# Patient Record
Sex: MALE | URBAN - METROPOLITAN AREA
[De-identification: names, ages, dates, MRNs, and addresses within clinical notes are randomized per-mention and may not be internally consistent; named-entity substitution may affect disease eponyms.]

---

## 2022-10-13 ENCOUNTER — APPOINTMENT (RX ONLY)
Dept: URBAN - METROPOLITAN AREA CLINIC 151 | Facility: CLINIC | Age: 32
Setting detail: DERMATOLOGY
End: 2022-10-13

## 2022-10-13 DIAGNOSIS — L21.8 OTHER SEBORRHEIC DERMATITIS: ICD-10-CM

## 2022-10-13 DIAGNOSIS — L29.89 OTHER PRURITUS: ICD-10-CM

## 2022-10-13 DIAGNOSIS — L29.8 OTHER PRURITUS: ICD-10-CM

## 2022-10-13 PROCEDURE — ? ORDER TESTS

## 2022-10-13 PROCEDURE — ? COUNSELING

## 2022-10-13 PROCEDURE — ? PRESCRIPTION

## 2022-10-13 PROCEDURE — 99204 OFFICE O/P NEW MOD 45 MIN: CPT

## 2022-10-13 PROCEDURE — ? DIAGNOSIS COMMENT

## 2022-10-13 RX ORDER — IVERMECTIN 3 MG/1
TABLET ORAL
Qty: 9 | Refills: 0 | Status: CANCELLED | COMMUNITY
Start: 2022-10-13

## 2022-10-13 RX ORDER — CICLOPIROX 10 MG/.96ML
SHAMPOO TOPICAL
Qty: 120 | Refills: 3 | Status: CANCELLED | COMMUNITY
Start: 2022-10-13

## 2022-10-13 RX ORDER — PERMETHRIN 50 MG/G
CREAM TOPICAL
Qty: 60 | Refills: 0 | Status: CANCELLED | COMMUNITY
Start: 2022-10-13

## 2022-10-13 RX ORDER — FLUOCINOLONE ACETONIDE 0.11 MG/ML
OIL TOPICAL
Qty: 118.28 | Refills: 0 | Status: CANCELLED | COMMUNITY
Start: 2022-10-13

## 2022-10-13 RX ADMIN — PERMETHRIN: 50 CREAM TOPICAL at 00:00

## 2022-10-13 RX ADMIN — CICLOPIROX: 10 SHAMPOO TOPICAL at 00:00

## 2022-10-13 RX ADMIN — IVERMECTIN: 3 TABLET ORAL at 00:00

## 2022-10-13 RX ADMIN — FLUOCINOLONE ACETONIDE: 0.11 OIL TOPICAL at 00:00

## 2022-10-13 NOTE — PROCEDURE: ORDER TESTS
Expected Date Of Service: 10/13/2022
Billing Type: Third-Party Bill
Lab Facility: [id number]
Performing Laboratory: [id number]
Bill For Surgical Tray: no

## 2022-10-13 NOTE — HPI: OTHER
Condition:: itchiness throughout body
Please Describe Your Condition:: Pt mentioned the itchiness started around 3 months ago when he was staying at a hotel for work. It started within a few days after he was around a person who was scratching himself a lot. After he left and cleaned his clothes and replaces his house linens and used an OTC soap, it got better for a little bit. However then it recurred. He states he does not notice the itch when he is working. But when he is doing nothing and in particular sitting around for a long time, he feels the itch, everywhere head to toe. He states he feels an itch first before he notices spots on his skin. He is currently living in an apartment by himself. He takes no medications currently. He is concerned there are mites biting him.

## 2022-10-13 NOTE — PROCEDURE: DIAGNOSIS COMMENT
Detail Level: Detailed
Comment: The patient’s history, as documented in the HPI, and physical exam is suggestive of a supratentorial etiology. However he does have fine erythematous papules on the feet without clear excoriations, for which I will consider other etiologies. I did not see scabies mites on mineral prep, but will treat for scabies and provided patient a handout how to clear his bedding and linens for mites. I also recommended he check again to rule out bed bugs or hire professional  for inspection, although he rash does not appear clearly to be arthropod reaction. Other possibilities could include follicular eczema on feet and irritant/bacterial folliculitis on trunk. Will also send generalized pruritus lab work. On follow-up, will consider biopsy for any primary lesions than transition to an anti-inflammatory and antibacterial treatment approach.
Render Risk Assessment In Note?: no

## 2022-10-18 ENCOUNTER — RX ONLY (OUTPATIENT)
Age: 32
Setting detail: RX ONLY
End: 2022-10-18

## 2022-10-18 RX ORDER — CICLOPIROX 10 MG/.96ML
SHAMPOO TOPICAL
Qty: 120 | Refills: 3 | Status: ERX | COMMUNITY
Start: 2022-10-18

## 2022-10-18 RX ORDER — PERMETHRIN 50 MG/G
CREAM TOPICAL
Qty: 60 | Refills: 0 | Status: ERX | COMMUNITY
Start: 2022-10-18

## 2022-10-18 RX ORDER — IVERMECTIN 3 MG/1
TABLET ORAL
Qty: 9 | Refills: 0 | Status: ERX | COMMUNITY
Start: 2022-10-18

## 2022-10-18 RX ORDER — FLUOCINOLONE ACETONIDE 0.11 MG/ML
OIL TOPICAL
Qty: 118.28 | Refills: 0 | Status: ERX | COMMUNITY
Start: 2022-10-18